# Patient Record
Sex: FEMALE | Race: WHITE | NOT HISPANIC OR LATINO | URBAN - NONMETROPOLITAN AREA
[De-identification: names, ages, dates, MRNs, and addresses within clinical notes are randomized per-mention and may not be internally consistent; named-entity substitution may affect disease eponyms.]

---

## 2021-01-08 ENCOUNTER — IMPORTED ENCOUNTER (OUTPATIENT)
Dept: URBAN - NONMETROPOLITAN AREA CLINIC 1 | Facility: CLINIC | Age: 71
End: 2021-01-08

## 2021-01-08 PROBLEM — H18.831: Noted: 2021-01-08

## 2021-01-08 PROCEDURE — 92002 INTRM OPH EXAM NEW PATIENT: CPT

## 2021-02-01 ENCOUNTER — IMPORTED ENCOUNTER (OUTPATIENT)
Dept: URBAN - NONMETROPOLITAN AREA CLINIC 1 | Facility: CLINIC | Age: 71
End: 2021-02-01

## 2021-02-01 PROBLEM — H43.812: Noted: 2021-02-01

## 2021-02-01 PROBLEM — Z96.1: Noted: 2021-11-29

## 2021-02-01 PROCEDURE — 92014 COMPRE OPH EXAM EST PT 1/>: CPT

## 2021-02-01 NOTE — PATIENT DISCUSSION
PVD os-Retina flat 360 with no breaks tears or heme.-S&S of RD/RT reviewed with pt.-Stressed that pt should contact our office right away with any changes or increase in symptoms.

## 2021-11-29 ENCOUNTER — IMPORTED ENCOUNTER (OUTPATIENT)
Dept: URBAN - NONMETROPOLITAN AREA CLINIC 1 | Facility: CLINIC | Age: 71
End: 2021-11-29

## 2021-11-29 PROCEDURE — 92015 DETERMINE REFRACTIVE STATE: CPT

## 2021-11-29 PROCEDURE — 92014 COMPRE OPH EXAM EST PT 1/>: CPT

## 2021-11-29 NOTE — PATIENT DISCUSSION
"PVD os-Retina flat 360 with no breaks tears or heme.-S&S of RD/RT reviewed with pt.-Stressed that pt should contact our office right away with any changes or increase in symptoms.""s/p PCIOL-Stable PCIOL OU.-Monitor for PCO. -RTC ."

## 2022-04-09 ASSESSMENT — TONOMETRY
OS_IOP_MMHG: 15
OD_IOP_MMHG: 15

## 2022-04-09 ASSESSMENT — VISUAL ACUITY
OD_CC: 20/80-1
OD_CC: J1
OS_CC: 20/30
OS_CC: 20/70
OD_SC: 20/30
OU_CC: 20/40
OD_CC: 20/70
OS_SC: 20/30
OS_CC: 20/70-1
OD_CC: 20/40
OS_CC: J1